# Patient Record
Sex: MALE | Race: WHITE | NOT HISPANIC OR LATINO | Employment: UNEMPLOYED | URBAN - METROPOLITAN AREA
[De-identification: names, ages, dates, MRNs, and addresses within clinical notes are randomized per-mention and may not be internally consistent; named-entity substitution may affect disease eponyms.]

---

## 2023-08-23 ENCOUNTER — HOSPITAL ENCOUNTER (EMERGENCY)
Facility: MEDICAL CENTER | Age: 38
End: 2023-08-23
Attending: EMERGENCY MEDICINE

## 2023-08-23 VITALS
HEIGHT: 69 IN | WEIGHT: 200.4 LBS | HEART RATE: 82 BPM | DIASTOLIC BLOOD PRESSURE: 60 MMHG | TEMPERATURE: 98 F | SYSTOLIC BLOOD PRESSURE: 111 MMHG | RESPIRATION RATE: 16 BRPM | BODY MASS INDEX: 29.68 KG/M2 | OXYGEN SATURATION: 96 %

## 2023-08-23 DIAGNOSIS — H60.522: ICD-10-CM

## 2023-08-23 PROCEDURE — 99282 EMERGENCY DEPT VISIT SF MDM: CPT

## 2023-08-23 RX ORDER — NEOMYCIN SULFATE, POLYMYXIN B SULFATE AND HYDROCORTISONE 10; 3.5; 1 MG/ML; MG/ML; [USP'U]/ML
4 SUSPENSION/ DROPS AURICULAR (OTIC) 4 TIMES DAILY
Qty: 12 ML | Refills: 0 | Status: ACTIVE | OUTPATIENT
Start: 2023-08-23 | End: 2023-08-30

## 2023-08-23 ASSESSMENT — PAIN DESCRIPTION - PAIN TYPE: TYPE: ACUTE PAIN

## 2023-08-23 ASSESSMENT — PAIN DESCRIPTION - DESCRIPTORS: DESCRIPTORS: ACHING

## 2023-08-23 NOTE — ED TRIAGE NOTES
Ambulatory to triage with   Chief Complaint   Patient presents with    Ear Pain     Left       Was working on a car on Saturday and got gasoline in left ear. Began having pain ranging from 5-10 and Left ear drainage since Monday. Currently drainage is clear but states it was green. Pain is 5/10. Denies fever or chills.

## 2023-08-23 NOTE — ED NOTES
Discharge orders received. Discharge instructions provided by ERP. AVS provided and reviewed with patient using  ipad. No IV placed during ED visit. Patient AOx4 and ambulatory. Patient discharged to self without incident.

## 2023-08-23 NOTE — ED NOTES
Patient ambulated to [facility] without incident. Primary assessment complete. Patient oriented to care area. Chart up for ERP.

## 2023-08-23 NOTE — ED PROVIDER NOTES
"ER Provider Note    Scribed for Jakob Ponce M.D. by Destiney Sanchez. 8/23/2023   10:52 AM    Primary Care Provider: No primary care provider noted.     CHIEF COMPLAINT  Chief Complaint   Patient presents with    Ear Pain     Left       EXTERNAL RECORDS REVIEWED  Other No prior records for review    HPI/ROS  LIMITATION TO HISTORY   Select: : None  OUTSIDE HISTORIAN(S):  Friend who is able to serve as a  and contribute to the patient's history    Srinivas Forrester is a 37 y.o. male who presents to the ED for evaluation of acute left ear pain onset five days ago. Patient reports that as he was working on his car, he accidentally got gasoline into his left ear. Since then, he has been having pain to it. Three days ago, he notes that his left ear began to have green drainage to it. He has tried using ear drops to help alleviate his symptoms. Despite the ear drops, his pain continues to persist without any improvement, prompting him to present to the ED today for further evaluation. Currently in the ED, he rates his pain a 5/10. Denies any hearing loss. No known drug allergies.    PAST MEDICAL HISTORY  History reviewed. No pertinent past medical history.    SURGICAL HISTORY  History reviewed. No pertinent surgical history.    FAMILY HISTORY  History reviewed. No pertinent family history.    SOCIAL HISTORY   reports that he has been smoking cigarettes. He has never used smokeless tobacco. He reports that he does not currently use alcohol. He reports that he does not use drugs.    CURRENT MEDICATIONS  None noted    ALLERGIES  No Known Allergies     PHYSICAL EXAM  /73   Pulse 88   Temp 36.9 °C (98.5 °F) (Temporal)   Resp 18   Ht 1.753 m (5' 9\")   Wt 90.9 kg (200 lb 6.4 oz)   SpO2 97%   BMI 29.59 kg/m²      Constitutional: Well developed, Well nourished, No distress, Non-toxic appearance.   HENT: Normocephalic. Left ear with erythema and edema of the ear canal and some opacity of the " tympanic membrane  Eyes: PERRL, EOMI, Conjunctiva normal, No discharge.   Skin: Warm, Dry, No rash.    Neurologic: Awake, alert.   Psychiatric: Affect normal, Mood normal.       COURSE & MEDICAL DECISION MAKING     ED Observation Status? No; Patient does not meet criteria for ED Observation.     INITIAL ASSESSMENT, COURSE AND PLAN  Care Narrative: Left ear pain, looks like a chemical otitis externa we will put the patient on eardrops, have the patient return with worsening symptoms.    10:52 AM - Patient was seen and evaluated at bedside. Patient presents to the ED for acute left ear pain that started five days ago after getting gasoline in it. After my exam, I discussed with the patient the plan of care, which includes sending the patient home with a prescription for ear drops. I explained to him that it will take time for his symptoms to completely resolve. Patient understands and verbalizes agreement to plan of care. I then informed the patient of my plan for discharge, which includes strict return precautions for any new or worsening symptoms. Patient understands and verbalizes agreement to plan of care. Patient is comfortable going home at this time.      DISPOSITION AND DISCUSSIONS    I have discussed management of the patient with the following physicians and VIMAL's:  None    Discussion of management with other QHP or appropriate source(s): None     Patient will be discharged home.    FOLLOW UP:  [facility], Emergency Dept  [address] [address] [phone]    If symptoms worsen      OUTPATIENT MEDICATIONS:  New Prescriptions    NEOMYCIN-POLYMYXIN-HC (PEDIOTIC HC) [id number] SUSPENSION    Administer 4 Drops into the left ear 4 times a day for 7 days.      FINAL DIAGNOSIS  1. Acute chemical otitis externa of left ear         Destiney CAMPOS (Scribe), am scribing for, and in the presence of, Jakob Ponce M.D..    Electronically signed by: Destiney Sanchez  (Scribe), 8/23/2023    IJakob M.D. personally performed the services described in this documentation, as scribed by Destiney Sanchez in my presence, and it is both accurate and complete.      The note accurately reflects work and decisions made by me.  Jakob Ponce M.D.  8/23/2023  4:20 PM